# Patient Record
(demographics unavailable — no encounter records)

---

## 2017-12-12 NOTE — EMERGENCY ROOM REPORT
History of Present Illness


General


Chief Complaint:  General Complaint


Source:  Patient





Present Illness


HPI


35 YO Male presents to the ED C/O Left ear pain 7/10 in severity x 5 days with 

progressive  left sided facial numbness x 2 days with numbness of his tongue as 

well. denies rashes, fevers, chills, or recent illness. pt. denies pmhx, and 

does not take medications. denies trauma, fall , or ear d/c. pt. denies dental 

pain or recent dental work. denies extremity weakness. denies nausea or 

vomiting. Denies CP, Palpitations, LOC, AMS, dizziness, Changes in Vision, 

Sensation, paresthesias, or a sudden severe headache.


Allergies:  


Coded Allergies:  


     No Known Allergies (Unverified , 12/12/17)





Patient History


Past Medical History:  see triage record


Past Surgical History:  none


Pertinent Family History:  none


Immunizations:  UTD


Reviewed Nursing Documentation:  PMH: Agreed, PSxH: Agreed





Nursing Documentation-PMH


Past Medical History:  No Stated History





Review of Systems


All Other Systems:  negative except mentioned in HPI





Physical Exam





Vital Signs








  Date Time  Temp Pulse Resp B/P (MAP) Pulse Ox O2 Delivery O2 Flow Rate FiO2


 


12/12/17 17:15 98.1 64 18 143/91 99 Room Air  








Sp02 EP Interpretation:  reviewed, normal


General Appearance:  no apparent distress, alert, GCS 15, non-toxic


Head:  normocephalic, atraumatic, other - abscence of forehead wrinkles on the 

left side, with prominence on the right


Eyes:  left eye other - weakness in the left orbicularis muscle. , bilateral 

eye normal inspection, bilateral eye PERRL


ENT:  hearing grossly normal, normal pharynx, no angioedema, normal voice, TMs 

+ canals normal, uvula midline


Neck:  full range of motion, no bony tend, supple/symm/no masses


Respiratory:  chest non-tender, lungs clear, normal breath sounds, speaking 

full sentences


Cardiovascular #1:  regular rate, rhythm


Rectal:  deferred


Musculoskeletal:  back normal, gait/station normal, normal range of motion


Neurologic:  alert, oriented x3, responsive, motor strength/tone normal, 

sensory intact, normal gait, speech normal, no pronator, sensory deficit - pt 

reports paresthesia in the left side of the face. , other - left sided facial 

muscle weakness noted including the forehead.


Skin:  normal color, no rash, warm/dry, well hydrated


Lymphatic:  no adenopathy





Medical Decision Making


PA Attestation


Dr. Bustos  is my supervising Physician whom patient management has been 

discussed with.


Diagnostic Impression:  


 Primary Impression:  


 Bell palsy


ER Course


35 YO Male presents to the ED C/O Left ear pain 7/10 in severity x 5 days with 

progressive  left sided facial numbness x 2 days with numbness of his tongue as 

well. denies rashes, fevers, chills, or recent illness. pt. denies pmhx, and 

does not take medications. denies trauma, fall , or ear d/c. pt. denies dental 

pain or recent dental work. denies extremity weakness. denies nausea or 

vomiting. Denies CP, Palpitations, LOC, AMS, dizziness, Changes in Vision, 

Sensation, paresthesias, or a sudden severe headache.





Ddx considered but are not limited to migraine, SAH, CVA, [place] Palsy Psedudo 

motor Cerebri, Mass lesion, Cluster HA, Tension HA, 


Vital signs: are WNL, pt. is afebrile


H&PE are most consistent with bells palsy, involvement of the forehead on PE.  

pt. was also examined by supervising physician Dr. Bustos, whom agrees.  





ORDERS: 


- none required at this time, dx is clinical. 





ED INTERVENTIONS: 


- d/w pt. that he will be d/c with conservative treatment and anti-virals in 

addition to prednisone. d/w pt. follow up with pmd in 3-5 days. gave ED return 

precautions for worsening or new symptoms. 





DISCHARGE: At this time pt. is stable for d/c to home. Will provide printed 

patient care instructions, and any necessary prescriptions. Care plan and 

follow up instructions have been discussed with the patient prior to discharge.





Pt to be D/C with Prednisone and Valacyclovir.





Last Vital Signs








  Date Time  Temp Pulse Resp B/P (MAP) Pulse Ox O2 Delivery O2 Flow Rate FiO2


 


12/12/17 17:15 98.1 64 18 143/91 99 Room Air  








Disposition:  HOME, SELF-CARE


Condition:  Stable


Scripts


Glycerin/Propylene Glycol (LUBRICANT EYE DROPS) 15 Ml Drops


2 DROP OP BID, #15 ML


   Prov: Destinee Mcwilliams         12/12/17 


Valacyclovir Hcl* (VALTREX*) 500 Mg Tablet


1000 MG ORAL TWICE A DAY for 5 Days, #10 TAB


   Prov: Destinee Mcwilliams.         12/12/17 


Prednisone* (PREDNISONE*) 20 Mg Tablet


60 MG ORAL DAILY for 5 Days, #15 TAB


   Prov: Destinee Mcwilliams.         12/12/17


Patient Instructions:  Hercules Palsy





Additional Instructions:  


Take medications as directed. 


 ** Follow up with a Primary Care Provider in 3-5 days, even if your symptoms 

have resolved. ** 


--Please review list of primary care clinics, if you do not already have a 

primary care provider





Return sooner to ED if new symptoms occur, or current symptoms become worse. 








- Please note that this Emergency Department Report was dictated using nPicker technology software, occasionally this can lead to 

erroneous entry secondary to interpretation by the dictation equipment.











Destinee Mcwilliams Dec 12, 2017 17:47